# Patient Record
Sex: FEMALE | Race: WHITE
[De-identification: names, ages, dates, MRNs, and addresses within clinical notes are randomized per-mention and may not be internally consistent; named-entity substitution may affect disease eponyms.]

---

## 2018-01-19 ENCOUNTER — HOSPITAL ENCOUNTER (EMERGENCY)
Dept: HOSPITAL 44 - ED | Age: 28
Discharge: HOME | End: 2018-01-19
Payer: COMMERCIAL

## 2018-01-19 VITALS — SYSTOLIC BLOOD PRESSURE: 112 MMHG | DIASTOLIC BLOOD PRESSURE: 56 MMHG

## 2018-01-19 DIAGNOSIS — J09.X2: Primary | ICD-10-CM

## 2018-01-19 PROCEDURE — 99282 EMERGENCY DEPT VISIT SF MDM: CPT

## 2018-01-19 RX ADMIN — BENZONATATE ONE MG: 100 CAPSULE ORAL at 04:16

## 2018-01-19 RX ADMIN — BENZONATATE ONE: 100 CAPSULE ORAL at 04:16

## 2018-01-19 NOTE — ED PHYSICIAN DOCUMENTATION
Upper Respiratory Symptoms





- HISTORIAN


Historian: patient





- HPI


Stated Complaint: FLU LIKE S/S


Chief Complaint: Cough/ Upper Respiratory


Additional Information: 





cough congestion ache all over fever onset 3-4 days takes tylenol ibu


Duration: intermittent episodes


Context: denies: recent foreign travel, insect bite(s), recent chemotherapy


Severity: moderate


Associated Symptoms: fever, chills, sore throat, hoarseness, chest pain, hurts 

to breathe, headache.  denies: runny nose, productive cough, shortness of breath


Worsened by Deep Breath: Yes





- ROS


CONST/EYES: denies: weakness, eye redness, eye itching


CVS/RESP: none.  denies: shortness of breath, palpitations


LYMPH: denies: leg swelling, rash


GI/: denies: none, abdominal pain, problems urinating, vomiting, nausea





- PAST HX


Lung Disease: none


PE Risk Factors: none


Surgeries/Procedures: none


Immunizations: UTD.  denies: influenza


Allergies/Adverse Reactions: 


 Allergies











Allergy/AdvReac Type Severity Reaction Status Date / Time


 


No Known Allergies Allergy   Verified 01/19/18 03:51














Home Medications: 


 Ambulatory Orders











 Medication  Instructions  Recorded


 


NK [NK]  01/19/18














- SOCIAL HX


Smoking History: non-smoker


Alcohol Use: occasionally


Drug Use: none





- FAMILY HX


Family History: no significant history





- VITAL SIGNS


Vital Signs: 





 Vital Signs











Temp Pulse Resp BP Pulse Ox


 


 98.5 F   69   16   112/56   98 


 


 01/19/18 03:35  01/19/18 03:35  01/19/18 03:35  01/19/18 03:35  01/19/18 03:35














- REVIEWED ASSESSMENTS


Nursing Assessment  Reviewed: Yes


Vitals Reviewed: Yes





Upper Respiratory Symptoms





- EXAM


General Appearance: mild distress


EENT: eyes nml inspection.  No: TM erythema, pharynx nml


Neck: normal inspection


Respiratory: no resp. distress, breath sounds nml, speaks full sentences.  No: 

no pain on inspiration, respiratory distress, prolonged expirations


Abdomen: non-tender, no distention


CVS: reg rate & rhythm, heart sounds normal


Skin: color nml, no rash, warm,dry.  No: cyanosis, diaphoresis


Extremities: non-tender, normal range of motion


Neuro/Psych: oriented x3, neuro intact, mood/affect nml





Discharge


Clincal Impression: 


 inf type a





Referrals: 


Primary Doctor,No [Primary Care Provider] - 2 Days


Comments: 





increase fluids tylenol or ibu rec robitussin for cough


Condition: Good


Disposition: 01 HOME, SELF-CARE


Decision to Admit: NO


Decision Time: 04:04